# Patient Record
Sex: FEMALE | Race: BLACK OR AFRICAN AMERICAN | URBAN - METROPOLITAN AREA
[De-identification: names, ages, dates, MRNs, and addresses within clinical notes are randomized per-mention and may not be internally consistent; named-entity substitution may affect disease eponyms.]

---

## 2022-06-24 RX ORDER — ACETAMINOPHEN 325 MG/1
TABLET ORAL
COMMUNITY

## 2022-06-24 RX ORDER — IBUPROFEN 200 MG
TABLET ORAL
COMMUNITY

## 2022-06-24 RX ORDER — IBUPROFEN 800 MG/1
TABLET ORAL
COMMUNITY

## 2022-06-24 RX ORDER — ONDANSETRON 4 MG/1
TABLET, ORALLY DISINTEGRATING ORAL
COMMUNITY
Start: 2021-07-09

## 2022-09-07 ENCOUNTER — TELEPHONE (OUTPATIENT)
Dept: WOUND CARE | Age: 57
End: 2022-09-07

## 2022-09-07 NOTE — TELEPHONE ENCOUNTER
Patient was told that someone would reach out to her to schedule appointment for surgery but no one has called her.  Please give a call back

## 2022-09-12 PROBLEM — D25.9 LEIOMYOMA OF UTERUS: Status: ACTIVE | Noted: 2022-09-12

## 2022-09-12 PROBLEM — I87.2 VENOUS INSUFFICIENCY OF BOTH LOWER EXTREMITIES: Status: ACTIVE | Noted: 2022-09-12

## 2022-10-05 PROBLEM — R52 BODY ACHES: Status: ACTIVE | Noted: 2022-10-05

## 2022-10-05 PROBLEM — N92.4 PREMENOPAUSAL MENORRHAGIA: Status: ACTIVE | Noted: 2022-10-05

## 2022-10-05 PROBLEM — N83.209 CYST OF OVARY: Status: ACTIVE | Noted: 2022-10-05

## 2022-10-05 PROBLEM — Z02.9 ENCOUNTERS FOR ADMINISTRATIVE PURPOSES: Status: ACTIVE | Noted: 2022-10-05
